# Patient Record
Sex: FEMALE | Race: WHITE | NOT HISPANIC OR LATINO | Employment: FULL TIME | URBAN - METROPOLITAN AREA
[De-identification: names, ages, dates, MRNs, and addresses within clinical notes are randomized per-mention and may not be internally consistent; named-entity substitution may affect disease eponyms.]

---

## 2024-03-09 PROCEDURE — 99283 EMERGENCY DEPT VISIT LOW MDM: CPT

## 2024-03-10 ENCOUNTER — APPOINTMENT (OUTPATIENT)
Dept: RADIOLOGY | Facility: HOSPITAL | Age: 44
End: 2024-03-10
Payer: MEDICAID

## 2024-03-10 ENCOUNTER — APPOINTMENT (OUTPATIENT)
Dept: CARDIOLOGY | Facility: HOSPITAL | Age: 44
End: 2024-03-10
Payer: MEDICAID

## 2024-03-10 ENCOUNTER — HOSPITAL ENCOUNTER (EMERGENCY)
Facility: HOSPITAL | Age: 44
Discharge: HOME | End: 2024-03-10
Attending: EMERGENCY MEDICINE
Payer: MEDICAID

## 2024-03-10 VITALS
DIASTOLIC BLOOD PRESSURE: 78 MMHG | OXYGEN SATURATION: 95 % | HEART RATE: 88 BPM | BODY MASS INDEX: 26.52 KG/M2 | RESPIRATION RATE: 16 BRPM | TEMPERATURE: 97 F | WEIGHT: 165 LBS | SYSTOLIC BLOOD PRESSURE: 128 MMHG | HEIGHT: 66 IN

## 2024-03-10 DIAGNOSIS — R07.9 CHEST PAIN, UNSPECIFIED TYPE: Primary | ICD-10-CM

## 2024-03-10 LAB
ANION GAP SERPL CALC-SCNC: 13 MMOL/L
BASOPHILS # BLD AUTO: 0.08 X10*3/UL (ref 0–0.1)
BASOPHILS NFR BLD AUTO: 1 %
BUN SERPL-MCNC: 11 MG/DL (ref 8–25)
CALCIUM SERPL-MCNC: 9.2 MG/DL (ref 8.5–10.4)
CHLORIDE SERPL-SCNC: 101 MMOL/L (ref 97–107)
CO2 SERPL-SCNC: 23 MMOL/L (ref 24–31)
CREAT SERPL-MCNC: 0.7 MG/DL (ref 0.4–1.6)
EGFRCR SERPLBLD CKD-EPI 2021: >90 ML/MIN/1.73M*2
EOSINOPHIL # BLD AUTO: 0.76 X10*3/UL (ref 0–0.7)
EOSINOPHIL NFR BLD AUTO: 9.8 %
ERYTHROCYTE [DISTWIDTH] IN BLOOD BY AUTOMATED COUNT: 12.3 % (ref 11.5–14.5)
GLUCOSE SERPL-MCNC: 113 MG/DL (ref 65–99)
HCT VFR BLD AUTO: 39.9 % (ref 36–46)
HGB BLD-MCNC: 13.7 G/DL (ref 12–16)
IMM GRANULOCYTES # BLD AUTO: 0.01 X10*3/UL (ref 0–0.7)
IMM GRANULOCYTES NFR BLD AUTO: 0.1 % (ref 0–0.9)
LYMPHOCYTES # BLD AUTO: 3.47 X10*3/UL (ref 1.2–4.8)
LYMPHOCYTES NFR BLD AUTO: 44.9 %
MCH RBC QN AUTO: 33.8 PG (ref 26–34)
MCHC RBC AUTO-ENTMCNC: 34.3 G/DL (ref 32–36)
MCV RBC AUTO: 99 FL (ref 80–100)
MONOCYTES # BLD AUTO: 0.49 X10*3/UL (ref 0.1–1)
MONOCYTES NFR BLD AUTO: 6.3 %
NEUTROPHILS # BLD AUTO: 2.92 X10*3/UL (ref 1.2–7.7)
NEUTROPHILS NFR BLD AUTO: 37.9 %
NRBC BLD-RTO: 0 /100 WBCS (ref 0–0)
PLATELET # BLD AUTO: 233 X10*3/UL (ref 150–450)
POTASSIUM SERPL-SCNC: 4 MMOL/L (ref 3.4–5.1)
RBC # BLD AUTO: 4.05 X10*6/UL (ref 4–5.2)
SODIUM SERPL-SCNC: 137 MMOL/L (ref 133–145)
TROPONIN T SERPL-MCNC: <6 NG/L
TROPONIN T SERPL-MCNC: <6 NG/L
WBC # BLD AUTO: 7.7 X10*3/UL (ref 4.4–11.3)

## 2024-03-10 PROCEDURE — 71045 X-RAY EXAM CHEST 1 VIEW: CPT | Performed by: STUDENT IN AN ORGANIZED HEALTH CARE EDUCATION/TRAINING PROGRAM

## 2024-03-10 PROCEDURE — 36415 COLL VENOUS BLD VENIPUNCTURE: CPT | Performed by: EMERGENCY MEDICINE

## 2024-03-10 PROCEDURE — 93005 ELECTROCARDIOGRAM TRACING: CPT

## 2024-03-10 PROCEDURE — 84484 ASSAY OF TROPONIN QUANT: CPT | Performed by: EMERGENCY MEDICINE

## 2024-03-10 PROCEDURE — 85025 COMPLETE CBC W/AUTO DIFF WBC: CPT | Performed by: EMERGENCY MEDICINE

## 2024-03-10 PROCEDURE — 80048 BASIC METABOLIC PNL TOTAL CA: CPT | Performed by: EMERGENCY MEDICINE

## 2024-03-10 PROCEDURE — 71045 X-RAY EXAM CHEST 1 VIEW: CPT

## 2024-03-10 ASSESSMENT — COLUMBIA-SUICIDE SEVERITY RATING SCALE - C-SSRS
1. IN THE PAST MONTH, HAVE YOU WISHED YOU WERE DEAD OR WISHED YOU COULD GO TO SLEEP AND NOT WAKE UP?: NO
6. HAVE YOU EVER DONE ANYTHING, STARTED TO DO ANYTHING, OR PREPARED TO DO ANYTHING TO END YOUR LIFE?: NO
2. HAVE YOU ACTUALLY HAD ANY THOUGHTS OF KILLING YOURSELF?: NO

## 2024-03-10 NOTE — ED PROVIDER NOTES
HPI   Chief Complaint   Patient presents with    Chest Pain     Pt stated she felt a mid sternal squeeze and a sharp pain at the same time 2320. A 12 lead EKG done at 2351. Pt stated she currently only is a bite light headed and a very slight CP. Pt also stated she has a hx of anxiety and thought that is what it could be.       Patient presents to the emergency department today with complaints of having a midsternal squeezing sensation which began approximately 11 PM.  The patient states that it became persistent, and lasted approximate 15 minutes.  The patient states that it radiated towards her back.  Patient states that it made her feel somewhat short of breath.  The patient states that it resolved after 15 minutes.  Patient states there is no residual pain.  The patient states that she does smoke a significant out of cigarettes, and smokes on a regular basis.  The patient states he does have a family history for coronary artery disease.  Patient states herself she has never had any coronary artery disease.  Patient states that she is never had any nausea or vomiting.  Patient denies any fevers or chills.  Patient denies cough or cold symptoms.  Patient states that she was concerned about the chest pain that she was having, and the fact that she does have family history.  The patient decided come to hospital for further evaluation.       No data recorded       Patient History   No past medical history on file.  No past surgical history on file.  No family history on file.  Social History     Tobacco Use    Smoking status: Not on file    Smokeless tobacco: Not on file   Substance Use Topics    Alcohol use: Not on file    Drug use: Not on file       Physical Exam   ED Triage Vitals [03/10/24 0000]   Temperature Heart Rate Respirations BP   36.1 °C (97 °F) 89 18 (!) 127/91      Pulse Ox Temp src Heart Rate Source Patient Position   98 % -- -- --      BP Location FiO2 (%)     -- --       Physical Exam  Vitals and  nursing note reviewed.   Constitutional:       General: She is not in acute distress.     Appearance: She is well-developed.   HENT:      Head: Normocephalic and atraumatic.   Eyes:      Conjunctiva/sclera: Conjunctivae normal.   Cardiovascular:      Rate and Rhythm: Normal rate and regular rhythm.      Heart sounds: No murmur heard.  Pulmonary:      Effort: Pulmonary effort is normal. No respiratory distress.      Breath sounds: Normal breath sounds.   Abdominal:      Palpations: Abdomen is soft.      Tenderness: There is no abdominal tenderness.   Musculoskeletal:         General: No swelling.      Cervical back: Neck supple.   Skin:     General: Skin is warm and dry.      Capillary Refill: Capillary refill takes less than 2 seconds.   Neurological:      Mental Status: She is alert.         ED Course & MDM   ED Course as of 03/10/24 2039   Sun Mar 10, 2024   0023 EKG: Normal sinus rhythm, normal QRS, no STEMI.  No ST abnormalities.  Interpreted by me. [FR]   0103 I did go over the patient's test result so far with her, and she is aware the fact that the initial cardiac enzymes were normal.  The patient is willing to stay for a second set of cardiac enzymes.  Patient, in my estimation, most likely be able to be discharged home.  The patient however is being recommended to get a stress test done as an outpatient. [FR]   0332 Patient is requesting be discharged home.  Patient's cardiac enzymes have not resulted as of yet, but I am going to check myself, and she will look at them and MyChart.  The patient was made aware the fact that we cannot say that she is definitely okay, however that she is only a heart score of 2, so I do feel that discharge is not out of the realm of possibility.  The patient will follow-up in the next few days with her primary care doctor to arrange outpatient stress testing to be done.  Patient is going to make attempts to stop smoking. [FR]      ED Course User Index  [FR] Donavan Barron  DO         Diagnoses as of 03/10/24 2039   Chest pain, unspecified type       Medical Decision Making  After my initial evaluation, the patient is going to require 2 sets of cardiac enzymes.  The patient does have a heart score of 2.  The patient, my estimation, will need to have blood work, and a chest x-ray done as well.    Amount and/or Complexity of Data Reviewed  Labs:  Decision-making details documented in ED Course.  Radiology:  Decision-making details documented in ED Course.  ECG/medicine tests:  Decision-making details documented in ED Course.      Labs Reviewed   BASIC METABOLIC PANEL - Abnormal       Result Value    Glucose 113 (*)     Sodium 137      Potassium 4.0      Chloride 101      Bicarbonate 23 (*)     Urea Nitrogen 11      Creatinine 0.70      eGFR >90      Calcium 9.2      Anion Gap 13     CBC WITH AUTO DIFFERENTIAL - Abnormal    WBC 7.7      nRBC 0.0      RBC 4.05      Hemoglobin 13.7      Hematocrit 39.9      MCV 99      MCH 33.8      MCHC 34.3      RDW 12.3      Platelets 233      Neutrophils % 37.9      Immature Granulocytes %, Automated 0.1      Lymphocytes % 44.9      Monocytes % 6.3      Eosinophils % 9.8      Basophils % 1.0      Neutrophils Absolute 2.92      Immature Granulocytes Absolute, Automated 0.01      Lymphocytes Absolute 3.47      Monocytes Absolute 0.49      Eosinophils Absolute 0.76 (*)     Basophils Absolute 0.08     SERIAL TROPONIN, INITIAL ([facility]) - Normal    Troponin T, High Sensitivity <6     SERIAL TROPONIN,  2 HOUR ([facility]) - Normal    Troponin T, High Sensitivity <6     TROPONIN T SERIES, HIGH SENSITIVITY (0, 2 HR, 6 HR)    Narrative:     The following orders were created for panel order Troponin T Series, High Sensitivity (0, 2HR, 6HR).  Procedure                               Abnormality         Status                     ---------                               -----------         ------                     Serial Troponin, Initial...[[id number]]  Normal               Final result               Serial Troponin, 2 Hour ...[[id number]]  Normal              Final result               Serial Troponin, 6 Hour ...[[id number]]                                                   Please view results for these tests on the individual orders.      XR chest 1 view   Final Result   1.  No evidence of acute cardiopulmonary process.             Signed by: Ayad Guaman 3/10/2024 1:19 AM   Dictation workstation:   [id number]         Procedure  Procedures     Donavan Barron DO  03/10/24 2040

## 2024-05-21 ENCOUNTER — APPOINTMENT (OUTPATIENT)
Dept: DERMATOLOGY | Facility: CLINIC | Age: 44
End: 2024-05-21
Payer: MEDICAID

## 2024-10-15 ENCOUNTER — OFFICE VISIT (OUTPATIENT)
Dept: URGENT CARE | Age: 44
End: 2024-10-15
Payer: COMMERCIAL

## 2024-10-15 VITALS
SYSTOLIC BLOOD PRESSURE: 136 MMHG | RESPIRATION RATE: 18 BRPM | DIASTOLIC BLOOD PRESSURE: 84 MMHG | TEMPERATURE: 98.4 F | HEIGHT: 67 IN | HEART RATE: 89 BPM | BODY MASS INDEX: 27.94 KG/M2 | WEIGHT: 178 LBS | OXYGEN SATURATION: 97 %

## 2024-10-15 DIAGNOSIS — R05.1 ACUTE COUGH: Primary | ICD-10-CM

## 2024-10-15 DIAGNOSIS — J06.9 URI, ACUTE: ICD-10-CM

## 2024-10-15 RX ORDER — DOXYCYCLINE 100 MG/1
100 CAPSULE ORAL 2 TIMES DAILY
Qty: 20 CAPSULE | Refills: 0 | Status: SHIPPED | OUTPATIENT
Start: 2024-10-15 | End: 2024-10-25

## 2024-10-15 RX ORDER — ALBUTEROL SULFATE 90 UG/1
2 INHALANT RESPIRATORY (INHALATION) EVERY 6 HOURS PRN
Qty: 18 G | Refills: 0 | Status: SHIPPED | OUTPATIENT
Start: 2024-10-15 | End: 2025-10-15

## 2024-10-15 NOTE — PROGRESS NOTES
"Subjective   Patient ID: Sheridan Roman is a 44 y.o. female. They present today with a chief complaint of Cough (Throat hurts, left ear pain x 11 days).    History of Present Illness  44-year-old female presents to the urgent care for complaint of dry nonproductive cough, sore throat, runny nose, and mild dull ear pain for past 10 to 11 days.  States does have a history of bronchitis and ear infections.  Denies any current fevers or chills, vomiting or sweats, chest pain, shortness of breath, abdominal pain.  States she does have some mild wheezing/flat.  Was taking Mucinex but it causes GI upset.  Patient dates she has allergy to penicillins causing GI upset. Prescribed doxycycline and albuterol inhaler.  Educated on supportive care.  Return/ER precautions.  Follow-up with PCP 1 to 2 weeks.  Patient agrees with plan.          Past Medical History  Allergies as of 10/15/2024    (No Known Allergies)       (Not in a hospital admission)       No past medical history on file.    No past surgical history on file.         Review of Systems  Review of Systems   All other systems reviewed and are negative.                                 Objective    Vitals:    10/15/24 0922   BP: 136/84   BP Location: Left arm   Patient Position: Sitting   BP Cuff Size: Adult   Pulse: 89   Resp: 18   Temp: 36.9 °C (98.4 °F)   SpO2: 94%   Weight: 80.7 kg (178 lb)   Height: 1.689 m (5' 6.5\")     No LMP recorded. Patient is perimenopausal.    Physical Exam  Vitals reviewed.   Constitutional:       General: She is not in acute distress.     Appearance: Normal appearance. She is not ill-appearing, toxic-appearing or diaphoretic.   HENT:      Head: Normocephalic and atraumatic.      Right Ear: Tympanic membrane, ear canal and external ear normal.      Left Ear: Tympanic membrane, ear canal and external ear normal.      Nose: Rhinorrhea present.      Mouth/Throat:      Mouth: Mucous membranes are moist.      Pharynx: Oropharynx is clear. "   Cardiovascular:      Rate and Rhythm: Normal rate and regular rhythm.   Pulmonary:      Effort: Pulmonary effort is normal. No respiratory distress.      Breath sounds: Normal breath sounds. No stridor. No wheezing, rhonchi or rales.   Abdominal:      General: Abdomen is flat.      Palpations: Abdomen is soft.      Tenderness: There is no abdominal tenderness. There is no right CVA tenderness or left CVA tenderness.   Musculoskeletal:      Cervical back: Normal range of motion and neck supple. No rigidity or tenderness.   Lymphadenopathy:      Cervical: No cervical adenopathy.   Skin:     General: Skin is warm and dry.   Neurological:      General: No focal deficit present.      Mental Status: She is alert and oriented to person, place, and time.   Psychiatric:         Mood and Affect: Mood normal.         Behavior: Behavior normal.         Procedures    Point of Care Test & Imaging Results from this visit  No results found for this visit on 10/15/24.   No results found.    Diagnostic study results (if any) were reviewed by Franklyn Smith PA-C.    Assessment/Plan   Allergies, medications, history, and pertinent labs/EKGs/Imaging reviewed by Franklyn Smith PA-C.     Medical Decision Making  44-year-old female presents to the urgent care for complaint of dry nonproductive cough, sore throat, runny nose, and mild dull ear pain for past 10 to 11 days.  States does have a history of bronchitis and ear infections.  Denies any current fevers or chills, vomiting or sweats, chest pain, shortness of breath, abdominal pain.  States she does have some mild wheezing/flat.  Was taking Mucinex but it causes GI upset.  Patient dates she has allergy to penicillins causing GI upset. Prescribed doxycycline and albuterol inhaler.  Educated on supportive care.  Return/ER precautions.  Follow-up with PCP 1 to 2 weeks.  Patient agrees with plan.    Orders and Diagnoses  There are no diagnoses linked to this  encounter.    Medical Admin Record      Patient disposition: Home    Electronically signed by Franklyn Smith PA-C  9:27 AM

## 2024-12-20 ENCOUNTER — HOSPITAL ENCOUNTER (OUTPATIENT)
Dept: RADIOLOGY | Facility: CLINIC | Age: 44
Discharge: HOME | End: 2024-12-20
Payer: COMMERCIAL

## 2024-12-20 ENCOUNTER — OFFICE VISIT (OUTPATIENT)
Dept: ORTHOPEDIC SURGERY | Facility: CLINIC | Age: 44
End: 2024-12-20
Payer: COMMERCIAL

## 2024-12-20 VITALS — BODY MASS INDEX: 29.73 KG/M2 | WEIGHT: 185 LBS | HEIGHT: 66 IN

## 2024-12-20 DIAGNOSIS — M25.511 RIGHT SHOULDER PAIN, UNSPECIFIED CHRONICITY: Primary | ICD-10-CM

## 2024-12-20 DIAGNOSIS — M25.511 RIGHT SHOULDER PAIN, UNSPECIFIED CHRONICITY: ICD-10-CM

## 2024-12-20 PROCEDURE — 73030 X-RAY EXAM OF SHOULDER: CPT | Mod: RIGHT SIDE | Performed by: RADIOLOGY

## 2024-12-20 PROCEDURE — 3008F BODY MASS INDEX DOCD: CPT

## 2024-12-20 PROCEDURE — 20610 DRAIN/INJ JOINT/BURSA W/O US: CPT | Mod: RT

## 2024-12-20 PROCEDURE — 73030 X-RAY EXAM OF SHOULDER: CPT | Mod: RT

## 2024-12-20 PROCEDURE — 99204 OFFICE O/P NEW MOD 45 MIN: CPT

## 2024-12-20 PROCEDURE — 2500000004 HC RX 250 GENERAL PHARMACY W/ HCPCS (ALT 636 FOR OP/ED)

## 2024-12-20 PROCEDURE — 99214 OFFICE O/P EST MOD 30 MIN: CPT | Mod: 25

## 2024-12-20 RX ORDER — LIDOCAINE HYDROCHLORIDE 5 MG/ML
4 INJECTION, SOLUTION INFILTRATION; PERINEURAL
Status: COMPLETED | OUTPATIENT
Start: 2024-12-20 | End: 2024-12-20

## 2024-12-20 RX ORDER — TRIAMCINOLONE ACETONIDE 40 MG/ML
40 INJECTION, SUSPENSION INTRA-ARTICULAR; INTRAMUSCULAR
Status: COMPLETED | OUTPATIENT
Start: 2024-12-20 | End: 2024-12-20

## 2024-12-20 ASSESSMENT — PAIN - FUNCTIONAL ASSESSMENT: PAIN_FUNCTIONAL_ASSESSMENT: 0-10

## 2024-12-20 ASSESSMENT — PAIN SCALES - GENERAL: PAINLEVEL_OUTOF10: 9

## 2024-12-20 ASSESSMENT — PAIN DESCRIPTION - DESCRIPTORS: DESCRIPTORS: SHARP;ACHING;DISCOMFORT;SORE

## 2024-12-20 NOTE — PROGRESS NOTES
HPI  Sheridan Roman is a 44 y.o. female  in office today for   Chief Complaint   Patient presents with    Right Shoulder - Pain     Years she's been having and limited ROM travels down into the elbow    .  she states the pain first started about 12 years ago, was told she had a RTC injury.  Improved with time and several rounds of PT (last PT ~7 years ago).  She states every winter the pain will return, no pain during the summer.  Pain at shoulder joint, worse when she lays on the shoulder at night.  RICE, pain medication if needed.      Medication  Current Outpatient Medications on File Prior to Visit   Medication Sig Dispense Refill    albuterol 90 mcg/actuation inhaler Inhale 2 puffs every 6 hours if needed for wheezing. 18 g 0     No current facility-administered medications on file prior to visit.       Physical Exam  Constitutional: well developed, well nourished female in no acute distress  Psychiatric: normal mood, appropriate affect  Eyes: sclera anicteric  HENT: normocephalic/atraumatic  CV: regular rate and rhythm   Respiratory: non labored breathing  Integumentary: no rash  Neurological: moves all extremities    Shoulder Musculoskeletal Exam    Inspection    Right      Ecchymosis: none      Peripheral edema: none      Atrophy: none      Masses: none    Palpation    Right      Crepitus: no crepitus      Increased warmth: none      Tenderness: present        Clavicle: none        AC joint: none        Rotator cuff: mild        Greater tuberosity: none        Bicipital groove: none        Proximal biceps: none    Range of Motion    Right      Active ROM: pain.       Active forward elevation: 170 (pain above 120).       Shoulder active abduction: 120.       Passive abduction: 170.       Active external rotation at side: 80.       Internal rotation: mid thoracic.     Strength    Right      External rotation: 5/5.       Internal rotation: 5/5.       Abduction: 4+/5.       Biceps: 4/5.     Neurovascular     Right      Right shoulder nerve sensation is normal.    Special Tests    Right    Rotator Cuff Signs      Neer's test: negative       Mello test: positive       Drop arm test: negative     Biceps/dalia Signs      Speed's test: negative     AC Joint Signs      Active horizontal adduction pain: negative     Instability Signs      Joint laxity: negative       Patient ID: Sheridan Roman is a 44 y.o. female.    L Inj/Asp: R subacromial bursa on 12/20/2024 10:33 AM  Indications: pain  Details: 22 G needle, posterior approach  Medications: 40 mg triamcinolone acetonide 40 mg/mL; 4 mL lidocaine 5 mg/mL (0.5 %)  Outcome: tolerated well, no immediate complications  Procedure, treatment alternatives, risks and benefits explained, specific risks discussed. Consent was given by the patient. Immediately prior to procedure a time out was called to verify the correct patient, procedure, equipment, support staff and site/side marked as required. Patient was prepped and draped in the usual sterile fashion.             Imaging/Lab:  X-rays were taken today which were reviewed by myself and read by myself and show no acute fracture or dislocation.  No degenerative changes.  Possible higher riding GH joint or could be projection of the image      Assessment  Assessment: chronic right shoulder pain    Plan  Plan:  History, physical exam, and imaging were reviewed with patient. Discussed options for her seasonal shoulder pain and possible RTC injury including RICE, antiinflammatories, injections, PT.  She would like to proceed with an injection at this time.  The right shoulder was injected per procedure note above.  If pain persists, would recommend working with PT. Orders can be entered.  Follow Up: Patient to follow up as needed if pain persists or gets worse.      All questions were answered for the patient prior to end of exam and patient addressed their understanding.    Sabi Hodges PA-C  12/20/24

## 2024-12-26 ENCOUNTER — TELEPHONE (OUTPATIENT)
Dept: OBSTETRICS AND GYNECOLOGY | Facility: CLINIC | Age: 44
End: 2024-12-26
Payer: COMMERCIAL

## 2024-12-26 NOTE — TELEPHONE ENCOUNTER
left voicemail to please call back to reschedule january 21st appointment , doctor is out that day

## 2025-01-21 ENCOUNTER — APPOINTMENT (OUTPATIENT)
Dept: OBSTETRICS AND GYNECOLOGY | Facility: CLINIC | Age: 45
End: 2025-01-21

## 2025-01-28 ENCOUNTER — APPOINTMENT (OUTPATIENT)
Dept: PRIMARY CARE | Facility: CLINIC | Age: 45
End: 2025-01-28
Payer: COMMERCIAL

## 2025-01-30 ENCOUNTER — APPOINTMENT (OUTPATIENT)
Dept: RADIOLOGY | Facility: CLINIC | Age: 45
End: 2025-01-30
Payer: COMMERCIAL

## 2025-01-30 DIAGNOSIS — Z12.31 SCREENING MAMMOGRAM FOR BREAST CANCER: ICD-10-CM

## 2025-01-31 ENCOUNTER — APPOINTMENT (OUTPATIENT)
Dept: RADIOLOGY | Facility: CLINIC | Age: 45
End: 2025-01-31
Payer: COMMERCIAL

## 2025-02-14 ENCOUNTER — APPOINTMENT (OUTPATIENT)
Dept: OBSTETRICS AND GYNECOLOGY | Facility: CLINIC | Age: 45
End: 2025-02-14
Payer: COMMERCIAL

## 2025-02-21 ENCOUNTER — HOSPITAL ENCOUNTER (EMERGENCY)
Facility: HOSPITAL | Age: 45
Discharge: HOME | End: 2025-02-21
Attending: EMERGENCY MEDICINE
Payer: COMMERCIAL

## 2025-02-21 ENCOUNTER — APPOINTMENT (OUTPATIENT)
Dept: CARDIOLOGY | Facility: HOSPITAL | Age: 45
End: 2025-02-21
Payer: COMMERCIAL

## 2025-02-21 ENCOUNTER — APPOINTMENT (OUTPATIENT)
Dept: RADIOLOGY | Facility: HOSPITAL | Age: 45
End: 2025-02-21
Payer: COMMERCIAL

## 2025-02-21 VITALS
DIASTOLIC BLOOD PRESSURE: 87 MMHG | HEIGHT: 67 IN | SYSTOLIC BLOOD PRESSURE: 128 MMHG | BODY MASS INDEX: 28.72 KG/M2 | OXYGEN SATURATION: 100 % | HEART RATE: 72 BPM | WEIGHT: 183 LBS | RESPIRATION RATE: 15 BRPM | TEMPERATURE: 97.7 F

## 2025-02-21 DIAGNOSIS — R07.9 CHEST PAIN, UNSPECIFIED TYPE: Primary | ICD-10-CM

## 2025-02-21 DIAGNOSIS — I10 HYPERTENSION, UNSPECIFIED TYPE: ICD-10-CM

## 2025-02-21 LAB
ALBUMIN SERPL BCP-MCNC: 4.4 G/DL (ref 3.4–5)
ALP SERPL-CCNC: 52 U/L (ref 33–110)
ALT SERPL W P-5'-P-CCNC: 25 U/L (ref 7–45)
ANION GAP SERPL CALCULATED.3IONS-SCNC: 12 MMOL/L (ref 10–20)
AST SERPL W P-5'-P-CCNC: 19 U/L (ref 9–39)
BASOPHILS # BLD AUTO: 0.05 X10*3/UL (ref 0–0.1)
BASOPHILS NFR BLD AUTO: 0.8 %
BILIRUB SERPL-MCNC: 0.4 MG/DL (ref 0–1.2)
BNP SERPL-MCNC: 3 PG/ML (ref 0–99)
BUN SERPL-MCNC: 14 MG/DL (ref 6–23)
CALCIUM SERPL-MCNC: 9.2 MG/DL (ref 8.6–10.3)
CARDIAC TROPONIN I PNL SERPL HS: 3 NG/L (ref 0–13)
CARDIAC TROPONIN I PNL SERPL HS: <3 NG/L (ref 0–13)
CHLORIDE SERPL-SCNC: 105 MMOL/L (ref 98–107)
CO2 SERPL-SCNC: 24 MMOL/L (ref 21–32)
CREAT SERPL-MCNC: 0.9 MG/DL (ref 0.5–1.05)
EGFRCR SERPLBLD CKD-EPI 2021: 81 ML/MIN/1.73M*2
EOSINOPHIL # BLD AUTO: 0.27 X10*3/UL (ref 0–0.7)
EOSINOPHIL NFR BLD AUTO: 4.5 %
ERYTHROCYTE [DISTWIDTH] IN BLOOD BY AUTOMATED COUNT: 12 % (ref 11.5–14.5)
GLUCOSE SERPL-MCNC: 93 MG/DL (ref 74–99)
HCT VFR BLD AUTO: 40.7 % (ref 36–46)
HGB BLD-MCNC: 14.3 G/DL (ref 12–16)
IMM GRANULOCYTES # BLD AUTO: 0.01 X10*3/UL (ref 0–0.7)
IMM GRANULOCYTES NFR BLD AUTO: 0.2 % (ref 0–0.9)
LYMPHOCYTES # BLD AUTO: 2.38 X10*3/UL (ref 1.2–4.8)
LYMPHOCYTES NFR BLD AUTO: 39.3 %
MAGNESIUM SERPL-MCNC: 1.8 MG/DL (ref 1.6–2.4)
MCH RBC QN AUTO: 34.3 PG (ref 26–34)
MCHC RBC AUTO-ENTMCNC: 35.1 G/DL (ref 32–36)
MCV RBC AUTO: 98 FL (ref 80–100)
MONOCYTES # BLD AUTO: 0.48 X10*3/UL (ref 0.1–1)
MONOCYTES NFR BLD AUTO: 7.9 %
NEUTROPHILS # BLD AUTO: 2.87 X10*3/UL (ref 1.2–7.7)
NEUTROPHILS NFR BLD AUTO: 47.3 %
NRBC BLD-RTO: 0 /100 WBCS (ref 0–0)
PLATELET # BLD AUTO: 244 X10*3/UL (ref 150–450)
POTASSIUM SERPL-SCNC: 3.9 MMOL/L (ref 3.5–5.3)
PROT SERPL-MCNC: 6.7 G/DL (ref 6.4–8.2)
RBC # BLD AUTO: 4.17 X10*6/UL (ref 4–5.2)
SODIUM SERPL-SCNC: 137 MMOL/L (ref 136–145)
WBC # BLD AUTO: 6.1 X10*3/UL (ref 4.4–11.3)

## 2025-02-21 PROCEDURE — 83880 ASSAY OF NATRIURETIC PEPTIDE: CPT

## 2025-02-21 PROCEDURE — 93005 ELECTROCARDIOGRAM TRACING: CPT

## 2025-02-21 PROCEDURE — 36415 COLL VENOUS BLD VENIPUNCTURE: CPT

## 2025-02-21 PROCEDURE — 84484 ASSAY OF TROPONIN QUANT: CPT

## 2025-02-21 PROCEDURE — 99285 EMERGENCY DEPT VISIT HI MDM: CPT | Mod: 25

## 2025-02-21 PROCEDURE — 99285 EMERGENCY DEPT VISIT HI MDM: CPT | Performed by: EMERGENCY MEDICINE

## 2025-02-21 PROCEDURE — 80053 COMPREHEN METABOLIC PANEL: CPT

## 2025-02-21 PROCEDURE — 83735 ASSAY OF MAGNESIUM: CPT

## 2025-02-21 PROCEDURE — 71045 X-RAY EXAM CHEST 1 VIEW: CPT

## 2025-02-21 PROCEDURE — 85025 COMPLETE CBC W/AUTO DIFF WBC: CPT

## 2025-02-21 PROCEDURE — 71045 X-RAY EXAM CHEST 1 VIEW: CPT | Mod: FOREIGN READ | Performed by: RADIOLOGY

## 2025-02-21 ASSESSMENT — LIFESTYLE VARIABLES
EVER HAD A DRINK FIRST THING IN THE MORNING TO STEADY YOUR NERVES TO GET RID OF A HANGOVER: NO
HAVE YOU EVER FELT YOU SHOULD CUT DOWN ON YOUR DRINKING: NO
HAVE PEOPLE ANNOYED YOU BY CRITICIZING YOUR DRINKING: NO
TOTAL SCORE: 0
EVER FELT BAD OR GUILTY ABOUT YOUR DRINKING: NO

## 2025-02-21 ASSESSMENT — HEART SCORE
AGE: 45-64
RISK FACTORS: 1-2 RISK FACTORS
TROPONIN: LESS THAN OR EQUAL TO NORMAL LIMIT
HEART SCORE: 3
HISTORY: MODERATELY SUSPICIOUS
ECG: NORMAL

## 2025-02-21 ASSESSMENT — PAIN DESCRIPTION - DESCRIPTORS
DESCRIPTORS: PRESSURE
DESCRIPTORS: PRESSURE

## 2025-02-21 ASSESSMENT — PAIN DESCRIPTION - LOCATION: LOCATION: CHEST

## 2025-02-21 ASSESSMENT — PAIN SCALES - GENERAL: PAINLEVEL_OUTOF10: 8

## 2025-02-21 ASSESSMENT — PAIN - FUNCTIONAL ASSESSMENT: PAIN_FUNCTIONAL_ASSESSMENT: 0-10

## 2025-02-21 ASSESSMENT — PAIN DESCRIPTION - ORIENTATION: ORIENTATION: MID

## 2025-02-21 NOTE — ED TRIAGE NOTES
Pt ambulatory to triage for CP. Pt sts over the past couple of days she been feeling squeezing pressure with nausea and dizziness  Pt has family history of cardiac problems. Pt denies any new stressors at this time. Pt sts the pain feels like contractions.

## 2025-02-21 NOTE — DISCHARGE INSTRUCTIONS
Please refer to the chest pain discharge instructions attached and follow-up with the cardiologist and your primary care provider for reevaluation of your symptoms.  Please also follow-up with your primary care provider for recheck of your blood pressure.    It is important to remember that your care does not end here and you must continue to monitor your condition closely. Please return to the emergency department for any worsening or concerning signs or symptoms as directed by our conversations and the discharge instructions. If you do not have a doctor please contact the referral number on your discharge instructions. Please contact any physician specialists provided in your discharge notes as it is very important to follow up with them regarding your condition. If you are unable to reach the physicians provided, please come back to the Emergency Department at any time.

## 2025-02-21 NOTE — Clinical Note
Sheridan Roman was seen and treated in our emergency department on 2/21/2025.  She may return to work on 02/22/2025.       If you have any questions or concerns, please don't hesitate to call.      Aurora Degroot PA-C LDR 2

## 2025-02-21 NOTE — ED PROVIDER NOTES
HPI   Chief Complaint   Patient presents with    Chest Pain     X 2 days        HPI  Patient is a 45-year-old otherwise healthy female presenting for evaluation of chest pain for the past day and 1/2 to 2 days.  Patient states the pain is in the middle of her chest and is intermittent.  She states it is a squeezing sensation.  She states it is worse when she lays down.  She has been taking Tylenol for the pain which provides minimal relief.  She states that she does feel somewhat nauseous and lightheaded accompanying the chest pain.  She denies any exertional symptoms.  No palpitations or shortness of breath.  No injury or trauma to the area.  She denies any history of ACS, DVT or PE.  No recent trauma hospitalization or surgery.  No recent leg swelling.  She states that both of her parents did have heart attacks around the same age of her which concerned her.  Denies abdominal pain, vomiting or diarrhea.      Patient History   No past medical history on file.  No past surgical history on file.  No family history on file.  Social History     Tobacco Use    Smoking status: Unknown    Smokeless tobacco: Not on file   Substance Use Topics    Alcohol use: Not on file    Drug use: Not on file       Physical Exam   ED Triage Vitals [02/21/25 1512]   Temperature Heart Rate Respirations BP   36.5 °C (97.7 °F) 88 18 (!) 155/114      Pulse Ox Temp Source Heart Rate Source Patient Position   100 % Temporal -- Sitting      BP Location FiO2 (%)     Left arm --       Physical Exam  Vitals and nursing note reviewed.   Constitutional:       General: She is not in acute distress.     Appearance: She is well-developed.      Comments: Resting in hospital chair in no apparent distress   HENT:      Head: Normocephalic and atraumatic.   Eyes:      Conjunctiva/sclera: Conjunctivae normal.   Cardiovascular:      Rate and Rhythm: Normal rate and regular rhythm.      Heart sounds: No murmur heard.     Comments: Radial pulses are +2  bilaterally  Pulmonary:      Effort: Pulmonary effort is normal. No respiratory distress.      Breath sounds: Normal breath sounds.   Abdominal:      Palpations: Abdomen is soft.      Tenderness: There is no abdominal tenderness.   Musculoskeletal:         General: No swelling.      Cervical back: Neck supple.      Right lower leg: No edema.      Left lower leg: No edema.   Skin:     General: Skin is warm and dry.      Capillary Refill: Capillary refill takes less than 2 seconds.   Neurological:      Mental Status: She is alert.   Psychiatric:         Mood and Affect: Mood normal.           ED Course & MDM   Diagnoses as of 02/21/25 1731   Chest pain, unspecified type   Hypertension, unspecified type                 No data recorded     [place] Coma Scale Score: 15 (02/21/25 1527 : Radha Vasquez RN) HEART Score: 3 (02/21/25 1706 : Aurora Degroot PA-C)                         Medical Decision Making  Parts of this chart have been completed using voice recognition software. Please excuse any errors of transcription.  My thought process and reason for plan has been formulated from the time that I saw the patient until the time of disposition and is not specific to one specific moment during their visit and furthermore my MDM encompasses this entire chart and not only this text box.      HPI: Detailed above.    Exam: A medically appropriate exam performed, outlined above, given the known history and presentation.    History obtained from: Patient    EKG: Interpreted by attending physician and reviewed by me    Social Determinants of Health considered during this visit: Lives independently    Medications given during visit:  Medications - No data to display     Diagnostic/tests  Labs Reviewed   CBC WITH AUTO DIFFERENTIAL - Abnormal       Result Value    WBC 6.1      nRBC 0.0      RBC 4.17      Hemoglobin 14.3      Hematocrit 40.7      MCV 98      MCH 34.3 (*)     MCHC 35.1      RDW 12.0      Platelets 244       Neutrophils % 47.3      Immature Granulocytes %, Automated 0.2      Lymphocytes % 39.3      Monocytes % 7.9      Eosinophils % 4.5      Basophils % 0.8      Neutrophils Absolute 2.87      Immature Granulocytes Absolute, Automated 0.01      Lymphocytes Absolute 2.38      Monocytes Absolute 0.48      Eosinophils Absolute 0.27      Basophils Absolute 0.05     COMPREHENSIVE METABOLIC PANEL - Normal    Glucose 93      Sodium 137      Potassium 3.9      Chloride 105      Bicarbonate 24      Anion Gap 12      Urea Nitrogen 14      Creatinine 0.90      eGFR 81      Calcium 9.2      Albumin 4.4      Alkaline Phosphatase 52      Total Protein 6.7      AST 19      Bilirubin, Total 0.4      ALT 25     MAGNESIUM - Normal    Magnesium 1.80     B-TYPE NATRIURETIC PEPTIDE - Normal    BNP 3      Narrative:        <100 pg/mL - Heart failure unlikely  100-299 pg/mL - Intermediate probability of acute heart                  failure exacerbation. Correlate with clinical                  context and patient history.    >=300 pg/mL - Heart Failure likely. Correlate with clinical                  context and patient history.    BNP testing is performed using different testing methodology at [facility] than at other [facility]. Direct result comparisons should only be made within the same method.      SERIAL TROPONIN-INITIAL - Normal    Troponin I, High Sensitivity <3      Narrative:     Less than 99th percentile of normal range cutoff-  Female and children under 18 years old <14 ng/L; Male <21 ng/L: Negative  Repeat testing should be performed if clinically indicated.     Female and children under 18 years old 14-50 ng/L; Male 21-50 ng/L:  Consistent with possible cardiac damage and possible increased clinical   risk. Serial measurements may help to assess extent of myocardial damage.     >50 ng/L: Consistent with cardiac damage, increased clinical risk and  myocardial infarction. Serial measurements may help assess  extent of   myocardial damage.      NOTE: Children less than 1 year old may have higher baseline troponin   levels and results should be interpreted in conjunction with the overall   clinical context.     NOTE: Troponin I testing is performed using a different   testing methodology at [facility] than at other   [facility]. Direct result comparisons should only   be made within the same method.   SERIAL TROPONIN, 1 HOUR - Normal    Troponin I, High Sensitivity 3      Narrative:     Less than 99th percentile of normal range cutoff-  Female and children under 18 years old <14 ng/L; Male <21 ng/L: Negative  Repeat testing should be performed if clinically indicated.     Female and children under 18 years old 14-50 ng/L; Male 21-50 ng/L:  Consistent with possible cardiac damage and possible increased clinical   risk. Serial measurements may help to assess extent of myocardial damage.     >50 ng/L: Consistent with cardiac damage, increased clinical risk and  myocardial infarction. Serial measurements may help assess extent of   myocardial damage.      NOTE: Children less than 1 year old may have higher baseline troponin   levels and results should be interpreted in conjunction with the overall   clinical context.     NOTE: Troponin I testing is performed using a different   testing methodology at [facility] than at other   [facility]. Direct result comparisons should only   be made within the same method.   TROPONIN SERIES- (INITIAL, 1 HR)    Narrative:     The following orders were created for panel order Troponin I Series, High Sensitivity (0, 1 HR).  Procedure                               Abnormality         Status                     ---------                               -----------         ------                     Troponin I, High Sensiti...[[id number]]  Normal              Final result               Troponin, High Sensitivi...[[id number]]  Normal              Final result                  Please view results for these tests on the individual orders.      XR chest 1 view   Final Result   1.  No acute findings on single AP view of the chest.   Signed by Jerry Jones MD           Considerations/further MDM:  Patient is a 45-year-old female presenting for evaluation of chest pain    Patient is well-appearing in no apparent distress during the visit.  Exam is unremarkable.  Vital signs initially with hypertension which did spontaneously improve throughout the visit otherwise vital signs are within normal limits.  EKG performed upon arrival is without evidence of acute ischemia or arrhythmia.  Laboratory workup is without significant electrolyte disturbance.  Cardiac enzymes are negative upon repeat.  Chest x-ray is without evidence of pneumothorax, pneumonia or findings consistent with CHF.  There is no widening of the mediastinum.  Chest pain is nonpleuritic patient is not tachycardic or hypoxic my suspicion is for acute pulmonary embolism is low at this time.  Patient felt to be at low risk for major adverse cardiac events based on heart score.  Patient is felt to be stable for further outpatient therapy with follow-up with primary care provider encouraged.  A referral to cardiology is provided given patient's positive family history for ACS.  Return precautions were discussed.  Patient is agreeable with this plan of care.  She is released in good condition.  I utilized an evidence-based risk rating tool (CMT) along with my training and experience to weigh the risk of discharge against the risks of further testing, imaging, or hospitalization. At this time I estimate the risks of additional testing, imaging, or hospitalization to be equal to or greater than the risk of discharge. I discussed my risk assessment with the patient and the patient consents to the risk of discharge as well as the risk of uncertainty in estimating outcomes.    The patient's HEART Score is <4. In rare cases, I  "give patients with HEART Score of 4 the option of discharge, but only when they meet criteria for \"Low 4,\" meaning that HST was used, and the 4 is not from a highly suspicious story, highly suspicious EKG, or positive cardiac enzymes. In these selected cases, the risk of a \"Low 4\" is still most likely lower than the risk of admission and further testing/imaging. [id number]              Procedure  Procedures     Aurora Degroot PA-C  02/21/25 1731    "

## 2025-02-24 LAB
ATRIAL RATE: 85 BPM
P AXIS: 67 DEGREES
P OFFSET: 200 MS
P ONSET: 157 MS
PR INTERVAL: 142 MS
Q ONSET: 228 MS
QRS COUNT: 14 BEATS
QRS DURATION: 58 MS
QT INTERVAL: 334 MS
QTC CALCULATION(BAZETT): 397 MS
QTC FREDERICIA: 375 MS
R AXIS: 51 DEGREES
T AXIS: 60 DEGREES
T OFFSET: 395 MS
VENTRICULAR RATE: 85 BPM

## 2025-06-17 ENCOUNTER — APPOINTMENT (OUTPATIENT)
Dept: RADIOLOGY | Facility: HOSPITAL | Age: 45
End: 2025-06-17
Payer: COMMERCIAL

## 2025-06-17 ENCOUNTER — APPOINTMENT (OUTPATIENT)
Dept: CARDIOLOGY | Facility: HOSPITAL | Age: 45
End: 2025-06-17
Payer: COMMERCIAL

## 2025-06-17 ENCOUNTER — HOSPITAL ENCOUNTER (EMERGENCY)
Facility: HOSPITAL | Age: 45
Discharge: HOME | End: 2025-06-17
Payer: COMMERCIAL

## 2025-06-17 VITALS
WEIGHT: 180 LBS | RESPIRATION RATE: 18 BRPM | SYSTOLIC BLOOD PRESSURE: 139 MMHG | HEART RATE: 75 BPM | TEMPERATURE: 97.7 F | BODY MASS INDEX: 28.25 KG/M2 | DIASTOLIC BLOOD PRESSURE: 90 MMHG | HEIGHT: 67 IN | OXYGEN SATURATION: 99 %

## 2025-06-17 DIAGNOSIS — R07.9 CHEST PAIN, UNSPECIFIED TYPE: Primary | ICD-10-CM

## 2025-06-17 LAB
ALBUMIN SERPL BCP-MCNC: 4.9 G/DL (ref 3.4–5)
ALP SERPL-CCNC: 61 U/L (ref 33–110)
ALT SERPL W P-5'-P-CCNC: 27 U/L (ref 7–45)
ANION GAP SERPL CALCULATED.3IONS-SCNC: 13 MMOL/L (ref 10–20)
AST SERPL W P-5'-P-CCNC: 19 U/L (ref 9–39)
BASOPHILS # BLD AUTO: 0.04 X10*3/UL (ref 0–0.1)
BASOPHILS NFR BLD AUTO: 0.8 %
BILIRUB SERPL-MCNC: 0.3 MG/DL (ref 0–1.2)
BUN SERPL-MCNC: 11 MG/DL (ref 6–23)
CALCIUM SERPL-MCNC: 9.8 MG/DL (ref 8.6–10.3)
CARDIAC TROPONIN I PNL SERPL HS: <3 NG/L (ref 0–13)
CHLORIDE SERPL-SCNC: 103 MMOL/L (ref 98–107)
CO2 SERPL-SCNC: 26 MMOL/L (ref 21–32)
CREAT SERPL-MCNC: 0.84 MG/DL (ref 0.5–1.05)
EGFRCR SERPLBLD CKD-EPI 2021: 87 ML/MIN/1.73M*2
EOSINOPHIL # BLD AUTO: 0.25 X10*3/UL (ref 0–0.7)
EOSINOPHIL NFR BLD AUTO: 4.8 %
ERYTHROCYTE [DISTWIDTH] IN BLOOD BY AUTOMATED COUNT: 11.9 % (ref 11.5–14.5)
GLUCOSE SERPL-MCNC: 87 MG/DL (ref 74–99)
HCT VFR BLD AUTO: 41.1 % (ref 36–46)
HGB BLD-MCNC: 13.9 G/DL (ref 12–16)
IMM GRANULOCYTES # BLD AUTO: 0 X10*3/UL (ref 0–0.7)
IMM GRANULOCYTES NFR BLD AUTO: 0 % (ref 0–0.9)
LYMPHOCYTES # BLD AUTO: 2.24 X10*3/UL (ref 1.2–4.8)
LYMPHOCYTES NFR BLD AUTO: 42.7 %
MAGNESIUM SERPL-MCNC: 1.84 MG/DL (ref 1.6–2.4)
MCH RBC QN AUTO: 32.7 PG (ref 26–34)
MCHC RBC AUTO-ENTMCNC: 33.8 G/DL (ref 32–36)
MCV RBC AUTO: 97 FL (ref 80–100)
MONOCYTES # BLD AUTO: 0.34 X10*3/UL (ref 0.1–1)
MONOCYTES NFR BLD AUTO: 6.5 %
NEUTROPHILS # BLD AUTO: 2.38 X10*3/UL (ref 1.2–7.7)
NEUTROPHILS NFR BLD AUTO: 45.2 %
NRBC BLD-RTO: 0 /100 WBCS (ref 0–0)
PLATELET # BLD AUTO: 259 X10*3/UL (ref 150–450)
POTASSIUM SERPL-SCNC: 3.9 MMOL/L (ref 3.5–5.3)
PROT SERPL-MCNC: 7.3 G/DL (ref 6.4–8.2)
RBC # BLD AUTO: 4.25 X10*6/UL (ref 4–5.2)
SODIUM SERPL-SCNC: 138 MMOL/L (ref 136–145)
WBC # BLD AUTO: 5.3 X10*3/UL (ref 4.4–11.3)

## 2025-06-17 PROCEDURE — 80053 COMPREHEN METABOLIC PANEL: CPT | Performed by: PHYSICIAN ASSISTANT

## 2025-06-17 PROCEDURE — 36415 COLL VENOUS BLD VENIPUNCTURE: CPT | Performed by: PHYSICIAN ASSISTANT

## 2025-06-17 PROCEDURE — 84484 ASSAY OF TROPONIN QUANT: CPT | Performed by: PHYSICIAN ASSISTANT

## 2025-06-17 PROCEDURE — 71045 X-RAY EXAM CHEST 1 VIEW: CPT | Performed by: RADIOLOGY

## 2025-06-17 PROCEDURE — 93005 ELECTROCARDIOGRAM TRACING: CPT

## 2025-06-17 PROCEDURE — 2550000001 HC RX 255 CONTRASTS: Performed by: PHYSICIAN ASSISTANT

## 2025-06-17 PROCEDURE — 85025 COMPLETE CBC W/AUTO DIFF WBC: CPT | Performed by: PHYSICIAN ASSISTANT

## 2025-06-17 PROCEDURE — 71275 CT ANGIOGRAPHY CHEST: CPT

## 2025-06-17 PROCEDURE — 99285 EMERGENCY DEPT VISIT HI MDM: CPT | Mod: 25

## 2025-06-17 PROCEDURE — 71275 CT ANGIOGRAPHY CHEST: CPT | Performed by: RADIOLOGY

## 2025-06-17 PROCEDURE — 83735 ASSAY OF MAGNESIUM: CPT | Performed by: PHYSICIAN ASSISTANT

## 2025-06-17 PROCEDURE — 74174 CTA ABD&PLVS W/CONTRAST: CPT | Performed by: RADIOLOGY

## 2025-06-17 PROCEDURE — 71045 X-RAY EXAM CHEST 1 VIEW: CPT

## 2025-06-17 RX ADMIN — IOHEXOL 75 ML: 350 INJECTION, SOLUTION INTRAVENOUS at 21:45

## 2025-06-17 ASSESSMENT — HEART SCORE
HEART SCORE: 2
HISTORY: SLIGHTLY SUSPICIOUS
AGE: 45-64
RISK FACTORS: 1-2 RISK FACTORS
ECG: NORMAL
TROPONIN: LESS THAN OR EQUAL TO NORMAL LIMIT

## 2025-06-17 ASSESSMENT — PAIN DESCRIPTION - LOCATION: LOCATION: BACK

## 2025-06-17 ASSESSMENT — PAIN SCALES - GENERAL
PAINLEVEL_OUTOF10: 6
PAINLEVEL_OUTOF10: 0 - NO PAIN
PAINLEVEL_OUTOF10: 6

## 2025-06-17 ASSESSMENT — PAIN - FUNCTIONAL ASSESSMENT
PAIN_FUNCTIONAL_ASSESSMENT: 0-10
PAIN_FUNCTIONAL_ASSESSMENT: 0-10

## 2025-06-17 ASSESSMENT — PAIN DESCRIPTION - PAIN TYPE: TYPE: ACUTE PAIN

## 2025-06-17 NOTE — ED TRIAGE NOTES
Pt to ED for dizziness for 3 days with back and chest pain since today. No headache, ear/sinus pain, cough, SOB, abdominal pain, N/V/D, urinary symptoms, fever, chills, body aches. Pt with sore throat.

## 2025-06-17 NOTE — ED PROVIDER NOTES
HPI   Chief Complaint   Patient presents with    Chest Pain    Back Pain    Dizziness     Pt to ED for dizziness for 3 days with back and chest pain since today. No headache, ear/sinus pain, cough, SOB, abdominal pain, N/V/D, urinary symptoms, fever, chills, body aches. Pt with sore throat.       HPI  45-year-old female coming in for chest discomfort, says that she has been little bit lightheaded for around 3 days but that comes and goes, and she has had 1 day history of some chest pain, she does endorse that the pain seems to go from the chest into the back between the shoulder blades, no shortness of breath.  No nausea or vomiting.  No illness like symptoms.      Patient History   Medical History[1]  Surgical History[2]  Family History[3]  Social History[4]    Physical Exam   ED Triage Vitals [06/17/25 1825]   Temperature Heart Rate Respirations BP   36.5 °C (97.7 °F) 75 18 139/90      Pulse Ox Temp Source Heart Rate Source Patient Position   99 % Temporal Monitor Sitting      BP Location FiO2 (%)     Left arm --       Physical Exam  PHYSICAL EXAMINATION    GENERAL APPEARANCE: Awake and alert.     VITAL SIGNS: As per the nurses' triage record.     HEENT: Normocephalic, atraumatic. Extraocular muscles are intact. Pupils equal round and reactive to light. Conjunctiva are pink. Negative scleral icterus. Mucous membranes are moist. Tongue in the midline. Pharynx was without erythema or exudates, uvula midline    NECK: Soft Nontender and supple, full gross ROM, no meningeal signs.    CHEST: Nontender to palpation. Clear to auscultation bilaterally. No rales, rhonchi, or wheezing.     HEART: S1, S2. Regular rate and rhythm. No murmurs, gallops or rubs.  Strong and equal pulses in the extremities.     ABDOMEN: Soft, nontender, nondistended, positive bowel sounds, no palpable masses.    MUSCULOSKELETAL: The calves are nontender to palpation. Full gross active range of motion. Ambulating on own with no acute difficulties      NEUROLOGICAL: Awake, alert and oriented x 3. Power intact in the upper and lower extremities. Sensation is intact to light touch in the upper and lower extremities.     Interval Baseline; National Institutes [employer] Stroke Scale Score  1a. LOC Alert, Keenly Responsive (0), 1b. LOC Questions Answers Both Questions Correctly (0), 1c. LOC Commands Performs Both Task Correctly (0), 2. Best Gaze Normal (0), 3. Visual No Visual Loss (0), 4. Facial Palsy Normal Symmetrical Movements (0), 5a. Motor Arm Left No Drift - Limb Holds 90 or 45 Degrees for Full 10 Seconds (0), 5b. Motor Arm Right No Drift - Limb Holds 90 or 45 Degrees for Full 10 Seconds (0), 6a. Motor Leg Left No Drift - Limb Holds 90 or 45 Degrees for Full 10 Seconds (0), 6b. Motor Leg Right No Drift - Limb Holds 90 or 45 Degrees for Full 10 Seconds (0), 7. Limb Ataxia Absent (0), 8. Sensory Normal - No Sensory Loss (0), 9. Best Language No Aphasia - Normal (0), 10. Dysarthria Normal (0), 11. Extinction and Inattention No Abnormality (0), Stroke Scale Total 0.    Additionally, the patient has a reassuring test of skew.    IMMUNOLOGICAL: No lymphatic streaking noted     DERM: No petechiae, rashes, or ecchymoses.    ED Course & MDM   ED Course as of 06/17/25 2229 Tue Jun 17, 2025 1830 EKG interpreted by myself independently, EKG shows normal sinus rhythm, rate of 73 bpm, W[place] interval 154, QRS 58, , QTc 423, patient has no ST elevation or depression, negative for acute MI. [JAVID]      ED Course User Index  [JAVID] Rolo Bran DO         Diagnoses as of 06/17/25 2229   Chest pain, unspecified type                 No data recorded     [place] Coma Scale Score: 15 (06/17/25 1826 : Marycarmen Maier RN) HEART Score: 2 (06/17/25 2229 : Ceasar Townsend PA-C)                         Medical Decision Making  Parts of this chart have been completed using voice recognition software. Please excuse any errors of transcription.  My thought process and reason  for plan has been formulated from the time that I saw the patient until the time of disposition and is not specific to one specific moment during their visit and furthermore my MDM encompasses this entire chart and not only this text box.      HPI: Detailed above.    Exam: A medically appropriate exam performed, outlined above, given the known history and presentation.    History Limited by: Nothing    History obtained from: The patient    External/internal records reviewed: No external record reviewed    Social Determinants of Health considered during this visit: Lives at home    Chronic conditions impacting care: Denies    Medications given during visit:  Medications   sodium chloride 0.9 % bolus 1,000 mL (1,000 mL intravenous Not Given 6/17/25 6162)   iohexol (OMNIPaque) 350 mg iodine/mL solution 75 mL (75 mL intravenous Given 6/17/25 3124)        Diagnostic/tests  Labs Reviewed   COMPREHENSIVE METABOLIC PANEL - Normal       Result Value    Glucose 87      Sodium 138      Potassium 3.9      Chloride 103      Bicarbonate 26      Anion Gap 13      Urea Nitrogen 11      Creatinine 0.84      eGFR 87      Calcium 9.8      Albumin 4.9      Alkaline Phosphatase 61      Total Protein 7.3      AST 19      Bilirubin, Total 0.3      ALT 27     MAGNESIUM - Normal    Magnesium 1.84     SERIAL TROPONIN-INITIAL - Normal    Troponin I, High Sensitivity <3      Narrative:     Less than 99th percentile of normal range cutoff-  Female and children under 18 years old <14 ng/L; Male <21 ng/L: Negative  Repeat testing should be performed if clinically indicated.     Female and children under 18 years old 14-50 ng/L; Male 21-50 ng/L:  Consistent with possible cardiac damage and possible increased clinical   risk. Serial measurements may help to assess extent of myocardial damage.     >50 ng/L: Consistent with cardiac damage, increased clinical risk and  myocardial infarction. Serial measurements may help assess extent of   myocardial  damage.      NOTE: Children less than 1 year old may have higher baseline troponin   levels and results should be interpreted in conjunction with the overall   clinical context.     NOTE: Troponin I testing is performed using a different   testing methodology at [facility] than at other   [facility] hospitals. Direct result comparisons should only   be made within the same method.   CBC WITH AUTO DIFFERENTIAL    WBC 5.3      nRBC 0.0      RBC 4.25      Hemoglobin 13.9      Hematocrit 41.1      MCV 97      MCH 32.7      MCHC 33.8      RDW 11.9      Platelets 259      Neutrophils % 45.2      Immature Granulocytes %, Automated 0.0      Lymphocytes % 42.7      Monocytes % 6.5      Eosinophils % 4.8      Basophils % 0.8      Neutrophils Absolute 2.38      Immature Granulocytes Absolute, Automated 0.00      Lymphocytes Absolute 2.24      Monocytes Absolute 0.34      Eosinophils Absolute 0.25      Basophils Absolute 0.04     TROPONIN SERIES- (INITIAL, 1 HR)    Narrative:     The following orders were created for panel order Troponin I Series, High Sensitivity (0, 1 HR).  Procedure                               Abnormality         Status                     ---------                               -----------         ------                     Troponin I, High Sensiti...[[id number]]  Normal              Final result               Troponin, High Sensitivi...[[id number]]                                                   Please view results for these tests on the individual orders.   URINALYSIS WITH REFLEX CULTURE AND MICROSCOPIC    Narrative:     The following orders were created for panel order Urinalysis with Reflex Culture and Microscopic.  Procedure                               Abnormality         Status                     ---------                               -----------         ------                     Urinalysis with Reflex C...[[id number]]                                                 Extra Urine Pierre  Tube[[id number]]                                                         Please view results for these tests on the individual orders.   HCG, URINE, QUALITATIVE   URINALYSIS WITH REFLEX CULTURE AND MICROSCOPIC   EXTRA URINE GRAY TUBE   SERIAL TROPONIN, 1 HOUR      CT angio chest abdomen pelvis   Final Result   CHEST   1.  No aneurysm or dissection of the thoracic aorta.        ABDOMEN - PELVIS   1.  No aneurysm or dissection of the abdominal aorta.   2. Arterial structures of the pelvis unremarkable.             MACRO:   None        Signed by: Jovan Herrmann 6/17/2025 10:18 PM   Dictation workstation:   [id number]      XR chest 1 view   Final Result   1.  No evidence of acute cardiopulmonary process.                  MACRO:   None        Signed by: Chidi Meyer 6/17/2025 6:41 PM   Dictation workstation:   [id number]           EKG: Obtained read by attending physician reviewed myself and Permenter patient no sign of STEMI criteria      Case discussed with: ed attending      Considerations/further MDM:  I estimate there is low risk for acute coronary syndrome including MI, intracranial hemorrhage, cardiac dysrhythmia, hypertension, tamponade, pneumothorax, hemothorax, pulmonary embolism, sepsis, intracranial hemorrhage, dissection, pneumonia, respiratory distress or compromise, pericardial effusion,  thus I consider the discharge disposition reasonable. We have discussed the diagnosis and risks, and we agree with discharging home to follow-up with their primary doctor or specialist as discussed and as provided.  We also discussed returning to the Emergency Department immediately if new or worsening symptoms occur. We have discussed the symptoms which are most concerning (e.g., bloody sputum, fever, worsening pain or shortness of breath, vomiting, weakness) that necessitate immediate return.    Heart score-2    No sign of aneurysm or dissection    No clinical findings to be suggestive of acute coronary  syndrome    Patient feels improved wants to go home, return precaution follow-up instructions discussed    Myself and attending physician feel discharge is reasonable at this time        Procedure  Procedures       [1] History reviewed. No pertinent past medical history.  [2] History reviewed. No pertinent surgical history.  [3] No family history on file.  [4]   Social History  Tobacco Use    Smoking status: Former     Types: Cigarettes    Smokeless tobacco: Never   Substance Use Topics    Alcohol use: Never    Drug use: Never        Ceasar Townsend PA-C  06/17/25 8556

## 2025-06-18 NOTE — ED PROVIDER NOTES
THIS IS MY [facility] SUPERVISORY AND SHARED VISIT NOTE:    I personally saw the patient and made/approved the management plan and take responsibility for the patient management.    History: Patient is a 45-year-old female presenting with a chief complaint of chest pain.  She had some chest discomfort, has felt little lightheaded last 3 days, this comes and goes, has a 1 day history of chest pain, goes into her back between her shoulder blades, no shortness of breath, no nausea or vomiting, no other acute complaints at this time.    Exam: GENERAL APPEARANCE: Awake and alert.     HEENT: Normocephalic, atraumatic. Extraocular muscles are intact. Pupils equal round and reactive to light.  CHEST: Nontender to palpation. Clear to auscultation bilaterally. No rales, rhonchi, or wheezing.   HEART: S1, S2. Regular rate and rhythm. No murmurs, gallops or rubs.  Strong and equal pulses in the extremities.   ABDOMEN: Soft,.  non-tender.  No rebound or guarding, bowel sounds normal x 4 quadrants  NEUROLOGICAL: Awake, alert and oriented x 3.       MDM: Patient seen and evaluated at bedside, patient is in no acute distress.  I will order a CBC, CMP, magnesium, troponin, x-ray chest, CT angio chest abdomen pelvis, EKG,. Differential diagnosis includes but is not limited to chest pain, shortness of breath, pneumonia, ACS,  Patient's lab work is reassuring, patient's x-ray chest and CT angio listed below, no remarkable findings, patiently discharged home, follow-up, they are agreeable this discharge plan.    Diagnosis: Chest pain  CT angio chest abdomen pelvis   Final Result   CHEST   1.  No aneurysm or dissection of the thoracic aorta.        ABDOMEN - PELVIS   1.  No aneurysm or dissection of the abdominal aorta.   2. Arterial structures of the pelvis unremarkable.             MACRO:   None        Signed by: Jovan Herrmann 6/17/2025 10:18 PM   Dictation workstation:   [id number]      XR chest 1 view   Final Result   1.  No evidence of  acute cardiopulmonary process.                  MACRO:   None        Signed by: Chidi Meyer 6/17/2025 6:41 PM   Dictation workstation:   [id number]        Results for orders placed or performed during the hospital encounter of 06/17/25   CBC and Auto Differential    Collection Time: 06/17/25  6:48 PM   Result Value Ref Range    WBC 5.3 4.4 - 11.3 x10*3/uL    nRBC 0.0 0.0 - 0.0 /100 WBCs    RBC 4.25 4.00 - 5.20 x10*6/uL    Hemoglobin 13.9 12.0 - 16.0 g/dL    Hematocrit 41.1 36.0 - 46.0 %    MCV 97 80 - 100 fL    MCH 32.7 26.0 - 34.0 pg    MCHC 33.8 32.0 - 36.0 g/dL    RDW 11.9 11.5 - 14.5 %    Platelets 259 150 - 450 x10*3/uL    Neutrophils % 45.2 40.0 - 80.0 %    Immature Granulocytes %, Automated 0.0 0.0 - 0.9 %    Lymphocytes % 42.7 13.0 - 44.0 %    Monocytes % 6.5 2.0 - 10.0 %    Eosinophils % 4.8 0.0 - 6.0 %    Basophils % 0.8 0.0 - 2.0 %    Neutrophils Absolute 2.38 1.20 - 7.70 x10*3/uL    Immature Granulocytes Absolute, Automated 0.00 0.00 - 0.70 x10*3/uL    Lymphocytes Absolute 2.24 1.20 - 4.80 x10*3/uL    Monocytes Absolute 0.34 0.10 - 1.00 x10*3/uL    Eosinophils Absolute 0.25 0.00 - 0.70 x10*3/uL    Basophils Absolute 0.04 0.00 - 0.10 x10*3/uL   Comprehensive Metabolic Panel    Collection Time: 06/17/25  6:48 PM   Result Value Ref Range    Glucose 87 74 - 99 mg/dL    Sodium 138 136 - 145 mmol/L    Potassium 3.9 3.5 - 5.3 mmol/L    Chloride 103 98 - 107 mmol/L    Bicarbonate 26 21 - 32 mmol/L    Anion Gap 13 10 - 20 mmol/L    Urea Nitrogen 11 6 - 23 mg/dL    Creatinine 0.84 0.50 - 1.05 mg/dL    eGFR 87 >60 mL/min/1.73m*2    Calcium 9.8 8.6 - 10.3 mg/dL    Albumin 4.9 3.4 - 5.0 g/dL    Alkaline Phosphatase 61 33 - 110 U/L    Total Protein 7.3 6.4 - 8.2 g/dL    AST 19 9 - 39 U/L    Bilirubin, Total 0.3 0.0 - 1.2 mg/dL    ALT 27 7 - 45 U/L   Magnesium    Collection Time: 06/17/25  6:48 PM   Result Value Ref Range    Magnesium 1.84 1.60 - 2.40 mg/dL   Troponin I, High Sensitivity, Initial    Collection  Time: 06/17/25  6:48 PM   Result Value Ref Range    Troponin I, High Sensitivity <3 0 - 13 ng/L     .    Please see [facility] note for further details    Sections of this report were created using voice-to-text technology and may contain errors in translation    Rolo Bran DO  Emergency Medicine       Rolo Bran DO  06/19/25 [id number]

## 2025-06-18 NOTE — DISCHARGE INSTRUCTIONS
"Thank you for allowing us to take care of you today. While you are home, you might receive a survey about your care in our hospital. Your nurse and myself, Ceasar Townsend PA-C, would love your honest feedback on how we can improve your care. Your feedback and especially your positive comments help our hospital receive the support we need to continue to serve you and your family. Thank you again for trusting us with your care.\"    Be sure to follow up as directed in 1-2 days.  All of the details of your follow up instructions are detailed in the follow up section of this packet.           It is important to remember that your care does not end here and you must continue to monitor your condition closely. Please return to the emergency department for any worsening or concerning signs or symptoms as directed by our conversations and the discharge instructions. Otherwise please follow up with your doctor in 2 days if no better or worse. If you do not have a doctor please contact the referral number on your discharge instructions. Please contact any physician specialists provided in your discharge notes as it is very important to follow up with them regarding your condition. If you are unable to reach the physicians provided, please come back to the Emergency Department at any time.        Return to emergency room without delay for ANY new or worsening pains or for any other symptoms or concerns.      "

## 2025-06-20 PROBLEM — F41.9 ANXIETY AND DEPRESSION: Status: ACTIVE | Noted: 2023-10-12

## 2025-06-20 PROBLEM — F60.3 BORDERLINE PERSONALITY DISORDER (MULTI): Status: ACTIVE | Noted: 2023-10-12

## 2025-06-20 PROBLEM — N20.0 KIDNEY STONE: Status: ACTIVE | Noted: 2025-06-20

## 2025-06-20 PROBLEM — N20.0 NEPHROLITHIASIS: Status: ACTIVE | Noted: 2021-08-13

## 2025-06-20 PROBLEM — F43.10 PTSD (POST-TRAUMATIC STRESS DISORDER): Status: ACTIVE | Noted: 2023-10-12

## 2025-06-20 PROBLEM — F32.A ANXIETY AND DEPRESSION: Status: ACTIVE | Noted: 2023-10-12

## 2025-06-20 PROBLEM — R10.9 LEFT FLANK PAIN: Status: ACTIVE | Noted: 2021-08-13

## 2025-06-20 RX ORDER — VARENICLINE TARTRATE 0.5 (11)-1
KIT ORAL
COMMUNITY
Start: 2024-05-21

## 2025-06-20 RX ORDER — BUPROPION HYDROCHLORIDE 150 MG/1
150 TABLET, EXTENDED RELEASE ORAL 2 TIMES DAILY
COMMUNITY
Start: 2025-04-04 | End: 2026-04-04

## 2025-06-25 ENCOUNTER — APPOINTMENT (OUTPATIENT)
Dept: CARDIOLOGY | Facility: CLINIC | Age: 45
End: 2025-06-25
Payer: COMMERCIAL

## 2025-07-01 ENCOUNTER — APPOINTMENT (OUTPATIENT)
Dept: CARDIOLOGY | Facility: CLINIC | Age: 45
End: 2025-07-01
Payer: COMMERCIAL

## 2025-07-01 VITALS
SYSTOLIC BLOOD PRESSURE: 122 MMHG | DIASTOLIC BLOOD PRESSURE: 76 MMHG | OXYGEN SATURATION: 98 % | RESPIRATION RATE: 18 BRPM | HEIGHT: 67 IN | BODY MASS INDEX: 30.26 KG/M2 | WEIGHT: 192.8 LBS | HEART RATE: 71 BPM

## 2025-07-01 DIAGNOSIS — E78.5 HYPERLIPIDEMIA, UNSPECIFIED HYPERLIPIDEMIA TYPE: Primary | ICD-10-CM

## 2025-07-01 DIAGNOSIS — R07.9 CHEST PAIN, UNSPECIFIED TYPE: ICD-10-CM

## 2025-07-01 DIAGNOSIS — Z82.49 FAMILY HISTORY OF PREMATURE CAD: ICD-10-CM

## 2025-07-01 PROCEDURE — 1036F TOBACCO NON-USER: CPT | Performed by: NURSE PRACTITIONER

## 2025-07-01 PROCEDURE — 3008F BODY MASS INDEX DOCD: CPT | Performed by: NURSE PRACTITIONER

## 2025-07-01 PROCEDURE — 93000 ELECTROCARDIOGRAM COMPLETE: CPT | Performed by: NURSE PRACTITIONER

## 2025-07-01 PROCEDURE — 99204 OFFICE O/P NEW MOD 45 MIN: CPT | Performed by: NURSE PRACTITIONER

## 2025-07-01 RX ORDER — SEMAGLUTIDE 1.34 MG/ML
0.25 INJECTION, SOLUTION SUBCUTANEOUS WEEKLY
COMMUNITY

## 2025-07-01 NOTE — PROGRESS NOTES
"Name : Sheridan Roman   : 1980   MRN : [id number]   ENC Date : 2025    CC:   Hospital Follow-up, Chest Pain, and Dizziness     HPI:    Sheridan Roman is a 45 y.o. female with PMHx sig for obesity & depression who presents today as above.    ER 25:   Chest pain for 2 days, went to sleep, Chest pain woke her up. BNP 3, hs troponin < 3 & 3. Other work up also unremarkable    ER 25:   Chest pain x2 days, squeezing in front & pain to the back. \"Like someone stuck a pencil through you\". Then started getting dizzy. hs troponin < 3. Other work up also unremarkable    Father MI at 44    She quit smoking 1 yr ago    Start Ozempic 1 week ago    CV Diagnostics:  EKG today shows Sinus Rhythm    ROS: unless otherwise noted in the history of present illness, all other systems were reviewed and they are negative for complaints     PMH:  As above    PSH:  noncontributory    SHx:  She reports that she quit smoking about 7 months ago. Her smoking use included cigarettes. She started smoking about 33 years ago. She has a 65.5 pack-year smoking history. She has never used smokeless tobacco. She reports that she does not drink alcohol and does not use drugs.    FHx:  Family History   Problem Relation Name Age of Onset    Hypertension Mother Carie     Stroke Mother Carie     Thyroid disease Mother Carie     Abnormal EKG Mother Carie     Heart murmur Mother Carie     Heart failure Father Lucius     Cancer Father Lucius     Stroke Father Lucius     Heart attack Father Lucius     Heart disease Father Lucius     Obesity Father Lucius     Abnormal EKG Father Lucius     Lung disease Father Lucius     Heart failure Father's Sister Marija     Heart attack Father's Sister Marija     Stroke Paternal Grandfather Lucius     Sudden death Paternal Grandfather Lucius     Heart attack Paternal Grandfather Lucius     Stroke Maternal Grandfather Lucius     Sudden death Father's Sister Yanely       Allergies:  Penicillins    Outpatient " "Medications:  Current Outpatient Medications   Medication Instructions    buPROPion SR (WELLBUTRIN SR) 150 mg, 2 times daily    Ozempic 0.25 mg, Weekly     Last Recorded Vitals:  Vitals:    07/01/25 1603   BP: 122/76   BP Location: Left arm   Patient Position: Sitting   Pulse: 71   Resp: 18   SpO2: 98%   Weight: 87.5 kg (192 lb 12.8 oz)   Height: 1.702 m (5' 7\")     Physical Exam:  On exam Ms. Sheridan Roman appears her stated age, is alert and oriented x3, and in no acute distress. Her sclera are anicteric and her oropharynx has moist mucous membranes. Her neck is supple and without thyromegaly. The JVP is ~5 cm of water above the right atrium. Her cardiac exam has regular rhythm, normal S1, S2. No S3/4. There are no murmurs. Her lungs are clear to auscultation bilaterally and there is no dullness to percussion. Her abdomen is soft, nontender with normoactive bowel sounds. There is no HJR. The extremities are warm and without edema. The skin is dry. There is no rash present. The distal pulses are 2-3+ in all four extremities. Her mood and affect are appropriate for todays encounter.      Last Labs:  CBC -  Lab Results   Component Value Date    WBC 5.3 06/17/2025    HGB 13.9 06/17/2025    HCT 41.1 06/17/2025    MCV 97 06/17/2025     06/17/2025       CMP -  Lab Results   Component Value Date    CALCIUM 9.8 06/17/2025    PROT 7.3 06/17/2025    ALBUMIN 4.9 06/17/2025    AST 19 06/17/2025    ALT 27 06/17/2025    ALKPHOS 61 06/17/2025    BILITOT 0.3 06/17/2025       LIPID PANEL -   No results found for: \"CHOL\", \"TRIG\", \"HDL\", \"CHHDL\", \"LDLF\", \"VLDL\", \"NHDL\"    RENAL FUNCTION PANEL -   Lab Results   Component Value Date    GLUCOSE 87 06/17/2025     06/17/2025    K 3.9 06/17/2025     06/17/2025    CO2 26 06/17/2025    ANIONGAP 13 06/17/2025    BUN 11 06/17/2025    CREATININE 0.84 06/17/2025    CALCIUM 9.8 06/17/2025    ALBUMIN 4.9 06/17/2025        Lab Results   Component Value Date    BNP 3 02/21/2025 "     I have personally reviewed the above lab results: CBC, chemistry, other labs as you see listed & diagnostics, I have specifically listed the results of these tests above.    Assessment/Plan:  Chest pain  Family history of premature CAD  Hyperlipidemia    C/f CAD. Exercise stress test  Lipid panel    Follow up after testing      Tracy M Schwab, APRN-CNP

## 2025-07-11 ENCOUNTER — APPOINTMENT (OUTPATIENT)
Dept: CARDIOLOGY | Facility: CLINIC | Age: 45
End: 2025-07-11
Payer: COMMERCIAL

## 2025-08-25 PROBLEM — Z78.0 MENOPAUSE: Status: ACTIVE | Noted: 2025-08-25

## 2025-08-27 ENCOUNTER — APPOINTMENT (OUTPATIENT)
Dept: PRIMARY CARE | Facility: CLINIC | Age: 45
End: 2025-08-27
Payer: COMMERCIAL

## 2025-08-27 DIAGNOSIS — F41.9 ANXIETY AND DEPRESSION: ICD-10-CM

## 2025-08-27 DIAGNOSIS — Z13.220 LIPID SCREENING: ICD-10-CM

## 2025-08-27 DIAGNOSIS — E78.00 PURE HYPERCHOLESTEROLEMIA: ICD-10-CM

## 2025-08-27 DIAGNOSIS — F32.A ANXIETY AND DEPRESSION: ICD-10-CM

## 2025-08-27 DIAGNOSIS — Z78.0 MENOPAUSE: ICD-10-CM

## 2025-08-27 DIAGNOSIS — F60.3 BORDERLINE PERSONALITY DISORDER (MULTI): ICD-10-CM

## 2025-08-27 DIAGNOSIS — R07.9 CHEST PAIN, UNSPECIFIED TYPE: Primary | ICD-10-CM

## 2025-09-03 ENCOUNTER — APPOINTMENT (OUTPATIENT)
Dept: PRIMARY CARE | Facility: CLINIC | Age: 45
End: 2025-09-03
Payer: COMMERCIAL

## 2025-09-03 PROBLEM — R06.02 SOBOE (SHORTNESS OF BREATH ON EXERTION): Status: ACTIVE | Noted: 2025-09-03

## 2025-09-03 ASSESSMENT — ENCOUNTER SYMPTOMS
LOSS OF SENSATION IN FEET: 0
OCCASIONAL FEELINGS OF UNSTEADINESS: 0
DEPRESSION: 0

## 2025-09-03 ASSESSMENT — PAIN SCALES - GENERAL: PAINLEVEL_OUTOF10: 0-NO PAIN

## 2025-09-03 ASSESSMENT — PATIENT HEALTH QUESTIONNAIRE - PHQ9
2. FEELING DOWN, DEPRESSED OR HOPELESS: NOT AT ALL
SUM OF ALL RESPONSES TO PHQ9 QUESTIONS 1 AND 2: 0
1. LITTLE INTEREST OR PLEASURE IN DOING THINGS: NOT AT ALL